# Patient Record
Sex: MALE | Race: WHITE
[De-identification: names, ages, dates, MRNs, and addresses within clinical notes are randomized per-mention and may not be internally consistent; named-entity substitution may affect disease eponyms.]

---

## 2019-01-12 ENCOUNTER — HOSPITAL ENCOUNTER (EMERGENCY)
Dept: HOSPITAL 50 - VM.ED | Age: 24
Discharge: HOME | End: 2019-01-12
Payer: SELF-PAY

## 2019-01-12 DIAGNOSIS — K52.9: Primary | ICD-10-CM

## 2019-01-12 LAB
ANION GAP SERPL CALC-SCNC: 18.9 MMOL/L (ref 10–20)
CHLORIDE SERPL-SCNC: 98 MMOL/L (ref 98–107)
SODIUM SERPL-SCNC: 137 MMOL/L (ref 136–145)

## 2019-01-12 PROCEDURE — 87804 INFLUENZA ASSAY W/OPTIC: CPT

## 2019-01-12 PROCEDURE — 80053 COMPREHEN METABOLIC PANEL: CPT

## 2019-01-12 PROCEDURE — 99284 EMERGENCY DEPT VISIT MOD MDM: CPT

## 2019-01-12 PROCEDURE — 85025 COMPLETE CBC W/AUTO DIFF WBC: CPT

## 2019-01-12 PROCEDURE — 96365 THER/PROPH/DIAG IV INF INIT: CPT

## 2019-01-12 NOTE — EDM.PDOC
ED HPI GENERAL MEDICAL PROBLEM





- General


Chief Complaint: Gastrointestinal Problem


Stated Complaint: VOMITING


Time Seen by Provider: 01/12/19 10:00


Source of Information: Reports: Patient


History Limitations: Reports: No Limitations





- History of Present Illness


INITIAL COMMENTS - FREE TEXT/NARRATIVE: 





Patient comes into the emergency department with complaint of nausea, vomiting, 

and diarrhea. Patient states it started approximately yesterday after he used 

some hemp oil. This is a 4 cm use hand while he was trying to use it for 

anxiety. He noticed within an hour to bed he became violently ill. He does 

acknowledge that the flu has been runaround especially in his family his 

significant other has had it and his mother his had it. He is unsure if it is 

the flu or if it is a reaction to the hemp oil. Pt states he has thrown up at 

least 10 times since yesterday and and had a minimum of 10 watery diarrhea 

episodes. He has had cold sweats as well. He denies any chest pain, SOB, 

headache, or edema.   


Onset: Sudden


Improves with: Reports: None


Worsens with: Reports: None


Associated Symptoms: Reports: Loss of Appetite, Malaise, Nausea/Vomiting





- Related Data


 Allergies











Allergy/AdvReac Type Severity Reaction Status Date / Time


 


No Known Allergies Allergy   Verified 01/12/19 10:15











Home Meds: 


 Home Meds





. [No Known Home Meds]  01/12/19 [History]











ED ROS GENERAL





- Review of Systems


Review Of Systems: See Below


Constitutional: Reports: Chills, Malaise, Fatigue, Night Sweats, Decreased 

Appetite


HEENT: Reports: No Symptoms


Respiratory: Reports: No Symptoms


Cardiovascular: Reports: No Symptoms


Endocrine: Reports: No Symptoms


GI/Abdominal: Reports: Anorexia, Diarrhea, Decreased Appetite, Nausea, Vomiting


: Reports: No Symptoms


Musculoskeletal: Reports: No Symptoms


Skin: Reports: No Symptoms


Neurological: Reports: No Symptoms


Psychiatric: Reports: No Symptoms


Hematologic/Lymphatic: Reports: No Symptoms


Immunologic: Reports: No Symptoms





ED EXAM, GENERAL





- Physical Exam


Exam: See Below


Exam Limited By: No Limitations


General Appearance: Alert, WD/WN, No Apparent Distress


Head: Atraumatic, Normocephalic


Neck: Normal Inspection, Supple, Non-Tender


Respiratory/Chest: No Respiratory Distress, Lungs Clear, Normal Breath Sounds, 

No Accessory Muscle Use, Chest Non-Tender


Cardiovascular: Normal Peripheral Pulses, Regular Rate, Rhythm, No Edema


Back Exam: Normal Inspection, Decreased Range of Motion


Extremities: Normal Inspection, Normal Range of Motion, Non-Tender, Normal 

Capillary Refill


Neurological: Alert, Oriented, Normal Gait


Psychiatric: Normal Affect, Normal Mood





Course





- Vital Signs


Last Recorded V/S: 


 Last Vital Signs











Temp  36.3 C   01/12/19 11:40


 


Pulse  86   01/12/19 11:40


 


Resp  16   01/12/19 11:40


 


BP  143/72 H  01/12/19 11:40


 


Pulse Ox  95   01/12/19 11:40














- Orders/Labs/Meds


Orders: 


 Active Orders 24 hr











 Category Date Time Status


 


 Sodium Chloride 0.9% [Saline Flush] Med  01/12/19 10:06 Active





 10 ml FLUSH ASDIRECTED PRN   


 


 Peripheral IV Insertion Adult [OM.PC] Stat Oth  01/12/19 10:06 Ordered








 Medication Orders





Sodium Chloride (Saline Flush)  10 ml FLUSH ASDIRECTED PRN


   PRN Reason: Keep Vein Open


   Last Admin: 01/12/19 10:26  Dose: 10 ml








Labs: 


 Laboratory Tests











  01/12/19 01/12/19 Range/Units





  10:23 10:23 


 


WBC  11.7 H   (4.0-10.0)  x10^3/uL


 


RBC  5.27   (4.5-6.0)  x10^6/uL


 


Hgb  16.0   (14.0-18.0)  g/dL


 


Hct  46.8   (40.0-52.0)  %


 


MCV  88.8   (78.0-93.0)  fL


 


MCH  30.4   (26.0-32.0)  pg


 


MCHC  34.2   (32.0-36.0)  g/dL


 


RDW Coeff of [place]  13.5   (10.0-15.0)  %


 


Plt Count  298   (130-400)  x10^3/uL


 


Neut % (Auto)  81.5 H   (50.0-80.0)  %


 


Lymph % (Auto)  8.4 L   (25.0-50.0)  %


 


Mono % (Auto)  9.6   (2.0-11.0)  %


 


Eos % (Auto)  0.3   (0.0-4.0)  %


 


Baso % (Auto)  0.2   (0.2-1.2)  %


 


Sodium   137  (136-145)  mmol/L


 


Potassium   3.9  (3.5-5.1)  mmol/L


 


Chloride   98  ()  mmol/L


 


Carbon Dioxide   24  (21-32)  mmol/L


 


Anion Gap   18.9  (10-20)  mmol/L


 


BUN   21 H  (7-18)  mg/dL


 


Creatinine   1.4 H  (0.70-1.30)  mg/dL


 


Est Cr Clr Drug Dosing   TNP  


 


Estimated GFR (MDRD)   > 60  


 


Glucose   126 H  ()  mg/dL


 


Calcium   10.1  (8.5-10.1)  mg/dL


 


Corrected Calcium   9.46  (8.5-10.1)  mg/dL


 


Total Bilirubin   1.1 H  (0.2-1.0)  mg/dL


 


AST   26  (15-37)  U/L


 


ALT   68 H  (16-63)  U/L


 


Alkaline Phosphatase   97  ()  U/L


 


Total Protein   9.1 H  (6.4-8.2)  g/dL


 


Albumin   4.8  (3.4-5.0)  g/dL


 


Globulin   4.3  


 


Albumin/Globulin Ratio   1.12  











Meds: 


Medications











Generic Name Dose Route Start Last Admin





  Trade Name Freq  PRN Reason Stop Dose Admin


 


Sodium Chloride  10 ml  01/12/19 10:06  01/12/19 10:26





  Saline Flush  FLUSH   10 ml





  ASDIRECTED PRN   Administration





  Keep Vein Open   





     





     





     














Discontinued Medications














Generic Name Dose Route Start Last Admin





  Trade Name Freq  PRN Reason Stop Dose Admin


 


Lactated Ringer's  1,000 mls @ 1,000 mls/hr  01/12/19 10:06  01/12/19 10:26





  Ringers, Lactated  IV  01/12/19 11:05  1,000 mls/hr





  NOW STA   Administration





     





     





     





     














- Re-Assessments/Exams


Free Text/Narrative Re-Assessment/Exam: 





01/12/19 11:51


Pt feeling better and would like to go home and rest. 





Departure





- Departure


Time of Disposition: 11:46


Disposition: Home, Self-Care 01


Condition: Good


Clinical Impression: 


 Gastroenteritis








- Discharge Information


*PRESCRIPTION DRUG MONITORING PROGRAM REVIEWED*: Not Applicable


*COPY OF PRESCRIPTION DRUG MONITORING REPORT IN PATIENT IGNACIO: Not Applicable


Instructions:  Viral Gastroenteritis, Adult, Easy-to-Read


Referrals: 


PCP,None [Primary Care Provider] - 


Forms:  ED Department Discharge, ED Return to Work/School Form


Additional Instructions: 


1. rest


2. slowly increase your water intake. take small drinks frequent and increase 

base on tolerance if not throwing up for approximately 2 hours try small 

portions of bland food 


3. Follow up as needed


4. It is unsure if the Hemp oil was the culprit of the violent vomiting but it 

is advisable to avoid using that in the future 


5. Call with any questions or concerns 





- My Orders


Last 24 Hours: 


My Active Orders





01/12/19 10:06


Sodium Chloride 0.9% [Saline Flush]   10 ml FLUSH ASDIRECTED PRN 


Peripheral IV Insertion Adult [OM.PC] Stat 














- Assessment/Plan


Last 24 Hours: 


My Active Orders





01/12/19 10:06


Sodium Chloride 0.9% [Saline Flush]   10 ml FLUSH ASDIRECTED PRN 


Peripheral IV Insertion Adult [OM.PC] Stat 











Assessment:: 





1. nausea/vomiting


2. Gastroenteritis 


Plan: 





1. Labs completed in ER. results reviewed with the  pt


2. IV hydration given in ER. 


3. Zofran given in ER. 


4. Pt is feeling better with IV hydration and zofran 


5. Pt will be sent home with education on activity, diet, and follow up


6. All questions and concerns answered prior to discharge